# Patient Record
(demographics unavailable — no encounter records)

---

## 2025-06-26 NOTE — HISTORY OF PRESENT ILLNESS
[de-identified] : Well-known patient returns today she was seen most recently July 2024 for she is here complaining of left knee pain especially going up and down steps she also is complaining of the fact that she cannot fully extend the knee.  She also is complaining overall she walks with a slower gait and pace she wants to continue to be active.

## 2025-06-26 NOTE — REASON FOR VISIT
[Follow-Up Visit] : a follow-up visit for [Knee Pain] : knee pain [FreeTextEntry2] : bilateral knee pain. has some questions to ask about her knees. not interested in doing xrays today.

## 2025-06-26 NOTE — DISCUSSION/SUMMARY
[de-identified] : Patient will be sent for physical therapy we also indicated that she may need to consider knee replacement surgery if not seeing sustained improvement with conservative measures she will ice the knees on a regular basis continue with her use of Celebrex as needed follow-up as needed.  This consultation lasted 30 minutes exclusive teaching time and separately billed procedures.

## 2025-06-26 NOTE — PHYSICAL EXAM
[de-identified] : Left knee on exam today patient has range of motion of 8 to 110 degrees there is some mild lateral joint line tenderness as well as medial joint line tenderness minimal soft tissue swelling no effusion is noted the knee is stable to stress varus valgus stress and near full extension and 90 degrees of flexion. [de-identified] : Knee radiographs were ordered today AP standing individual sunrise views were obtained showing moderate diminishment of all 3 compartments of both knees with approximately uniform 70 to 80% cartilage loss.  No evidence of acute trauma injury or fracture.